# Patient Record
Sex: FEMALE | Race: WHITE | NOT HISPANIC OR LATINO | URBAN - METROPOLITAN AREA
[De-identification: names, ages, dates, MRNs, and addresses within clinical notes are randomized per-mention and may not be internally consistent; named-entity substitution may affect disease eponyms.]

---

## 2019-05-14 PROBLEM — Z98.84 BARIATRIC SURGERY STATUS: Status: ACTIVE | Noted: 2018-12-21

## 2019-05-14 PROBLEM — E04.1 NONTOXIC UNINODULAR GOITER: Status: ACTIVE | Noted: 2017-11-24

## 2020-02-04 PROBLEM — E78.00 HYPERCHOLESTEROLEMIA: Status: ACTIVE | Noted: 2020-02-04

## 2020-02-08 PROBLEM — L30.8 PSORIASIFORM ECZEMA: Status: ACTIVE | Noted: 2020-02-08

## 2020-02-08 PROBLEM — T81.42XA INFECTION OF DEEP INCISIONAL SURGICAL SITE AFTER PROCEDURE, INITIAL ENCOUNTER: Status: ACTIVE | Noted: 2020-02-08

## 2020-06-01 ENCOUNTER — E-ADVICE (OUTPATIENT)
Dept: SURGERY | Age: 50
End: 2020-06-01

## 2020-06-09 ENCOUNTER — OFFICE VISIT (OUTPATIENT)
Dept: SURGERY | Age: 50
End: 2020-06-09

## 2020-06-09 VITALS
HEIGHT: 63 IN | TEMPERATURE: 98.3 F | BODY MASS INDEX: 28.7 KG/M2 | DIASTOLIC BLOOD PRESSURE: 88 MMHG | SYSTOLIC BLOOD PRESSURE: 148 MMHG | WEIGHT: 162 LBS | HEART RATE: 72 BPM

## 2020-06-09 DIAGNOSIS — E88.1 LIPODYSTROPHY: Primary | ICD-10-CM

## 2020-06-09 RX ORDER — LEVOTHYROXINE SODIUM 0.2 MG/1
TABLET ORAL
COMMUNITY
Start: 2016-09-04

## 2020-06-09 RX ORDER — NYSTATIN 100000 [USP'U]/G
POWDER TOPICAL
COMMUNITY
Start: 2019-06-04

## 2020-06-09 RX ORDER — NYSTATIN 100000 U/G
CREAM TOPICAL
COMMUNITY
Start: 2020-04-13

## 2020-06-09 RX ORDER — FOLIC ACID 1 MG/1
TABLET ORAL
COMMUNITY
Start: 2019-04-12

## 2020-06-09 RX ORDER — CEFADROXIL 500 MG/1
CAPSULE ORAL
COMMUNITY
Start: 2020-03-16

## 2020-06-09 RX ORDER — FENOFIBRATE 150 MG/1
CAPSULE ORAL
COMMUNITY
Start: 2016-09-06

## 2020-06-09 RX ORDER — METHOTREXATE 25 MG/ML
INJECTION INTRA-ARTERIAL; INTRAMUSCULAR; INTRATHECAL; INTRAVENOUS
COMMUNITY
Start: 2019-05-11

## 2020-06-09 RX ORDER — USTEKINUMAB 45 MG/.5ML
INJECTION, SOLUTION SUBCUTANEOUS
COMMUNITY
Start: 2020-06-05

## 2020-06-09 RX ORDER — LEVOTHYROXINE SODIUM 0.1 MG/1
TABLET ORAL
COMMUNITY
Start: 2020-04-14

## 2020-06-09 RX ORDER — TRAMADOL HYDROCHLORIDE 50 MG/1
2 TABLET ORAL
COMMUNITY
Start: 2019-02-07

## 2020-06-09 RX ORDER — CLINDAMYCIN HYDROCHLORIDE 300 MG/1
CAPSULE ORAL
COMMUNITY
Start: 2020-04-27

## 2020-06-09 RX ORDER — LEVOTHYROXINE SODIUM 100 MCG
TABLET ORAL
COMMUNITY
Start: 2020-04-15

## 2020-06-09 RX ORDER — LEVOTHYROXINE SODIUM 0.12 MG/1
TABLET ORAL
COMMUNITY
Start: 2020-02-14

## 2020-06-09 RX ORDER — LANOLIN ALCOHOL/MO/W.PET/CERES
1000 CREAM (GRAM) TOPICAL
COMMUNITY
Start: 2017-11-03

## 2020-06-09 RX ORDER — FOLIC ACID 1 MG/1
TABLET ORAL
COMMUNITY
Start: 2020-06-08

## 2020-07-07 ASSESSMENT — ENCOUNTER SYMPTOMS
GASTROINTESTINAL NEGATIVE: 1
ALLERGIC/IMMUNOLOGIC NEGATIVE: 1
NEUROLOGICAL NEGATIVE: 1
CONSTITUTIONAL NEGATIVE: 1
RESPIRATORY NEGATIVE: 1
ENDOCRINE NEGATIVE: 1
EYES NEGATIVE: 1
PSYCHIATRIC NEGATIVE: 1
HEMATOLOGIC/LYMPHATIC NEGATIVE: 1

## 2020-09-21 ENCOUNTER — LAB ENCOUNTER (OUTPATIENT)
Dept: LAB | Facility: HOSPITAL | Age: 50
End: 2020-09-21
Attending: PLASTIC SURGERY
Payer: COMMERCIAL

## 2020-09-21 DIAGNOSIS — R79.89 LOW THYROID STIMULATING HORMONE (TSH) LEVEL: ICD-10-CM

## 2020-09-21 DIAGNOSIS — L98.7 EXCESS SKIN OF THIGH: ICD-10-CM

## 2020-09-22 LAB — SARS-COV-2 RNA RESP QL NAA+PROBE: NOT DETECTED

## 2020-09-23 ENCOUNTER — ANESTHESIA EVENT (OUTPATIENT)
Dept: SURGERY | Facility: HOSPITAL | Age: 50
End: 2020-09-23
Payer: COMMERCIAL

## 2020-09-24 ENCOUNTER — ANESTHESIA (OUTPATIENT)
Dept: SURGERY | Facility: HOSPITAL | Age: 50
End: 2020-09-24
Payer: COMMERCIAL

## 2020-09-24 ENCOUNTER — HOSPITAL ENCOUNTER (OUTPATIENT)
Facility: HOSPITAL | Age: 50
Discharge: HOME | End: 2020-09-25
Attending: PLASTIC SURGERY | Admitting: PLASTIC SURGERY
Payer: COMMERCIAL

## 2020-09-24 DIAGNOSIS — L98.7 EXCESS SKIN OF THIGH: Primary | ICD-10-CM

## 2020-09-24 PROCEDURE — 88304 TISSUE EXAM BY PATHOLOGIST: CPT | Performed by: PLASTIC SURGERY

## 2020-09-24 PROCEDURE — S0077 INJECTION, CLINDAMYCIN PHOSP: HCPCS | Performed by: PLASTIC SURGERY

## 2020-09-24 PROCEDURE — 0JBM0ZZ EXCISION OF LEFT UPPER LEG SUBCUTANEOUS TISSUE AND FASCIA, OPEN APPROACH: ICD-10-PCS | Performed by: PLASTIC SURGERY

## 2020-09-24 PROCEDURE — 81025 URINE PREGNANCY TEST: CPT | Performed by: PLASTIC SURGERY

## 2020-09-24 PROCEDURE — 0HBHXZZ EXCISION OF RIGHT UPPER LEG SKIN, EXTERNAL APPROACH: ICD-10-PCS | Performed by: PLASTIC SURGERY

## 2020-09-24 PROCEDURE — 0JBL0ZZ EXCISION OF RIGHT UPPER LEG SUBCUTANEOUS TISSUE AND FASCIA, OPEN APPROACH: ICD-10-PCS | Performed by: PLASTIC SURGERY

## 2020-09-24 PROCEDURE — 88305 TISSUE EXAM BY PATHOLOGIST: CPT | Performed by: PLASTIC SURGERY

## 2020-09-24 RX ORDER — CLINDAMYCIN PHOSPHATE 600 MG/50ML
600 INJECTION INTRAVENOUS EVERY 8 HOURS
Status: DISCONTINUED | OUTPATIENT
Start: 2020-09-24 | End: 2020-09-25

## 2020-09-24 RX ORDER — HYDROMORPHONE HYDROCHLORIDE 1 MG/ML
0.4 INJECTION, SOLUTION INTRAMUSCULAR; INTRAVENOUS; SUBCUTANEOUS EVERY 5 MIN PRN
Status: DISCONTINUED | OUTPATIENT
Start: 2020-09-24 | End: 2020-09-24 | Stop reason: HOSPADM

## 2020-09-24 RX ORDER — HYDROMORPHONE HYDROCHLORIDE 1 MG/ML
INJECTION, SOLUTION INTRAMUSCULAR; INTRAVENOUS; SUBCUTANEOUS
Status: COMPLETED
Start: 2020-09-24 | End: 2020-09-24

## 2020-09-24 RX ORDER — KETAMINE HYDROCHLORIDE 50 MG/ML
INJECTION, SOLUTION, CONCENTRATE INTRAMUSCULAR; INTRAVENOUS AS NEEDED
Status: DISCONTINUED | OUTPATIENT
Start: 2020-09-24 | End: 2020-09-24 | Stop reason: SURG

## 2020-09-24 RX ORDER — ENOXAPARIN SODIUM 100 MG/ML
40 INJECTION SUBCUTANEOUS DAILY
Status: COMPLETED | OUTPATIENT
Start: 2020-09-24 | End: 2020-09-24

## 2020-09-24 RX ORDER — DEXAMETHASONE SODIUM PHOSPHATE 4 MG/ML
VIAL (ML) INJECTION AS NEEDED
Status: DISCONTINUED | OUTPATIENT
Start: 2020-09-24 | End: 2020-09-24 | Stop reason: SURG

## 2020-09-24 RX ORDER — ONDANSETRON 2 MG/ML
4 INJECTION INTRAMUSCULAR; INTRAVENOUS AS NEEDED
Status: DISCONTINUED | OUTPATIENT
Start: 2020-09-24 | End: 2020-09-24 | Stop reason: HOSPADM

## 2020-09-24 RX ORDER — LIDOCAINE HYDROCHLORIDE AND EPINEPHRINE 10; 10 MG/ML; UG/ML
INJECTION, SOLUTION INFILTRATION; PERINEURAL AS NEEDED
Status: DISCONTINUED | OUTPATIENT
Start: 2020-09-24 | End: 2020-09-24 | Stop reason: HOSPADM

## 2020-09-24 RX ORDER — HYDROCODONE BITARTRATE AND ACETAMINOPHEN 5; 325 MG/1; MG/1
2 TABLET ORAL EVERY 4 HOURS PRN
Status: DISCONTINUED | OUTPATIENT
Start: 2020-09-24 | End: 2020-09-24 | Stop reason: SDUPTHER

## 2020-09-24 RX ORDER — ONDANSETRON 2 MG/ML
INJECTION INTRAMUSCULAR; INTRAVENOUS AS NEEDED
Status: DISCONTINUED | OUTPATIENT
Start: 2020-09-24 | End: 2020-09-24 | Stop reason: SURG

## 2020-09-24 RX ORDER — NALOXONE HYDROCHLORIDE 0.4 MG/ML
80 INJECTION, SOLUTION INTRAMUSCULAR; INTRAVENOUS; SUBCUTANEOUS AS NEEDED
Status: DISCONTINUED | OUTPATIENT
Start: 2020-09-24 | End: 2020-09-24 | Stop reason: HOSPADM

## 2020-09-24 RX ORDER — HYDROCODONE BITARTRATE AND ACETAMINOPHEN 5; 325 MG/1; MG/1
1 TABLET ORAL EVERY 4 HOURS PRN
Status: DISCONTINUED | OUTPATIENT
Start: 2020-09-24 | End: 2020-09-24 | Stop reason: SDUPTHER

## 2020-09-24 RX ORDER — SODIUM CHLORIDE, SODIUM LACTATE, POTASSIUM CHLORIDE, CALCIUM CHLORIDE 600; 310; 30; 20 MG/100ML; MG/100ML; MG/100ML; MG/100ML
INJECTION, SOLUTION INTRAVENOUS CONTINUOUS PRN
Status: DISCONTINUED | OUTPATIENT
Start: 2020-09-24 | End: 2020-09-24 | Stop reason: SURG

## 2020-09-24 RX ORDER — SODIUM CHLORIDE, SODIUM LACTATE, POTASSIUM CHLORIDE, CALCIUM CHLORIDE 600; 310; 30; 20 MG/100ML; MG/100ML; MG/100ML; MG/100ML
INJECTION, SOLUTION INTRAVENOUS CONTINUOUS
Status: DISCONTINUED | OUTPATIENT
Start: 2020-09-24 | End: 2020-09-24 | Stop reason: HOSPADM

## 2020-09-24 RX ORDER — ACETAMINOPHEN 500 MG
1000 TABLET ORAL ONCE AS NEEDED
Status: DISCONTINUED | OUTPATIENT
Start: 2020-09-24 | End: 2020-09-24 | Stop reason: HOSPADM

## 2020-09-24 RX ORDER — ZOLPIDEM TARTRATE 5 MG/1
5 TABLET ORAL NIGHTLY PRN
Status: DISCONTINUED | OUTPATIENT
Start: 2020-09-24 | End: 2020-09-25

## 2020-09-24 RX ORDER — ACETAMINOPHEN 500 MG
1000 TABLET ORAL ONCE
Status: DISCONTINUED | OUTPATIENT
Start: 2020-09-24 | End: 2020-09-24 | Stop reason: HOSPADM

## 2020-09-24 RX ORDER — ONDANSETRON 2 MG/ML
4 INJECTION INTRAMUSCULAR; INTRAVENOUS EVERY 6 HOURS PRN
Status: DISPENSED | OUTPATIENT
Start: 2020-09-24 | End: 2020-09-25

## 2020-09-24 RX ORDER — NEOSTIGMINE METHYLSULFATE 1 MG/ML
INJECTION INTRAVENOUS AS NEEDED
Status: DISCONTINUED | OUTPATIENT
Start: 2020-09-24 | End: 2020-09-24 | Stop reason: SURG

## 2020-09-24 RX ORDER — SODIUM CHLORIDE, SODIUM LACTATE, POTASSIUM CHLORIDE, CALCIUM CHLORIDE 600; 310; 30; 20 MG/100ML; MG/100ML; MG/100ML; MG/100ML
INJECTION, SOLUTION INTRAVENOUS CONTINUOUS
Status: DISCONTINUED | OUTPATIENT
Start: 2020-09-24 | End: 2020-09-24

## 2020-09-24 RX ORDER — ROCURONIUM BROMIDE 10 MG/ML
INJECTION, SOLUTION INTRAVENOUS AS NEEDED
Status: DISCONTINUED | OUTPATIENT
Start: 2020-09-24 | End: 2020-09-24 | Stop reason: SURG

## 2020-09-24 RX ORDER — ENOXAPARIN SODIUM 100 MG/ML
40 INJECTION SUBCUTANEOUS DAILY
Status: DISCONTINUED | OUTPATIENT
Start: 2020-09-25 | End: 2020-09-25

## 2020-09-24 RX ORDER — MIDAZOLAM HYDROCHLORIDE 1 MG/ML
INJECTION INTRAMUSCULAR; INTRAVENOUS AS NEEDED
Status: DISCONTINUED | OUTPATIENT
Start: 2020-09-24 | End: 2020-09-24 | Stop reason: SURG

## 2020-09-24 RX ORDER — BUPIVACAINE HYDROCHLORIDE AND EPINEPHRINE 5; 5 MG/ML; UG/ML
INJECTION, SOLUTION EPIDURAL; INTRACAUDAL; PERINEURAL AS NEEDED
Status: DISCONTINUED | OUTPATIENT
Start: 2020-09-24 | End: 2020-09-24 | Stop reason: HOSPADM

## 2020-09-24 RX ORDER — HYDROCODONE BITARTRATE AND ACETAMINOPHEN 5; 325 MG/1; MG/1
2 TABLET ORAL AS NEEDED
Status: DISCONTINUED | OUTPATIENT
Start: 2020-09-24 | End: 2020-09-24 | Stop reason: HOSPADM

## 2020-09-24 RX ORDER — HYDRALAZINE HYDROCHLORIDE 20 MG/ML
INJECTION INTRAMUSCULAR; INTRAVENOUS AS NEEDED
Status: DISCONTINUED | OUTPATIENT
Start: 2020-09-24 | End: 2020-09-24 | Stop reason: SURG

## 2020-09-24 RX ORDER — LIDOCAINE HYDROCHLORIDE 10 MG/ML
INJECTION, SOLUTION EPIDURAL; INFILTRATION; INTRACAUDAL; PERINEURAL AS NEEDED
Status: DISCONTINUED | OUTPATIENT
Start: 2020-09-24 | End: 2020-09-24 | Stop reason: SURG

## 2020-09-24 RX ORDER — GLYCOPYRROLATE 0.2 MG/ML
INJECTION, SOLUTION INTRAMUSCULAR; INTRAVENOUS AS NEEDED
Status: DISCONTINUED | OUTPATIENT
Start: 2020-09-24 | End: 2020-09-24 | Stop reason: SURG

## 2020-09-24 RX ORDER — HYDROCODONE BITARTRATE AND ACETAMINOPHEN 5; 325 MG/1; MG/1
1 TABLET ORAL EVERY 4 HOURS PRN
Status: DISCONTINUED | OUTPATIENT
Start: 2020-09-24 | End: 2020-09-25

## 2020-09-24 RX ORDER — DEXTROSE, SODIUM CHLORIDE, SODIUM LACTATE, POTASSIUM CHLORIDE, AND CALCIUM CHLORIDE 5; .6; .31; .03; .02 G/100ML; G/100ML; G/100ML; G/100ML; G/100ML
INJECTION, SOLUTION INTRAVENOUS CONTINUOUS
Status: DISCONTINUED | OUTPATIENT
Start: 2020-09-24 | End: 2020-09-25

## 2020-09-24 RX ORDER — METOCLOPRAMIDE HYDROCHLORIDE 5 MG/ML
10 INJECTION INTRAMUSCULAR; INTRAVENOUS AS NEEDED
Status: DISCONTINUED | OUTPATIENT
Start: 2020-09-24 | End: 2020-09-24 | Stop reason: HOSPADM

## 2020-09-24 RX ORDER — CLINDAMYCIN PHOSPHATE 900 MG/50ML
900 INJECTION INTRAVENOUS ONCE
Status: COMPLETED | OUTPATIENT
Start: 2020-09-24 | End: 2020-09-24

## 2020-09-24 RX ORDER — HYDROCODONE BITARTRATE AND ACETAMINOPHEN 5; 325 MG/1; MG/1
1 TABLET ORAL AS NEEDED
Status: DISCONTINUED | OUTPATIENT
Start: 2020-09-24 | End: 2020-09-24 | Stop reason: HOSPADM

## 2020-09-24 RX ADMIN — SODIUM CHLORIDE, SODIUM LACTATE, POTASSIUM CHLORIDE, CALCIUM CHLORIDE: 600; 310; 30; 20 INJECTION, SOLUTION INTRAVENOUS at 11:23:00

## 2020-09-24 RX ADMIN — MIDAZOLAM HYDROCHLORIDE 2 MG: 1 INJECTION INTRAMUSCULAR; INTRAVENOUS at 07:59:00

## 2020-09-24 RX ADMIN — GLYCOPYRROLATE 0.4 MG: 0.2 INJECTION, SOLUTION INTRAMUSCULAR; INTRAVENOUS at 11:07:00

## 2020-09-24 RX ADMIN — ENOXAPARIN SODIUM 40 MG: 100 INJECTION SUBCUTANEOUS at 08:10:00

## 2020-09-24 RX ADMIN — LIDOCAINE HYDROCHLORIDE 50 MG: 10 INJECTION, SOLUTION EPIDURAL; INFILTRATION; INTRACAUDAL; PERINEURAL at 07:59:00

## 2020-09-24 RX ADMIN — DEXAMETHASONE SODIUM PHOSPHATE 4 MG: 4 MG/ML VIAL (ML) INJECTION at 08:07:00

## 2020-09-24 RX ADMIN — HYDRALAZINE HYDROCHLORIDE 5 MG: 20 INJECTION INTRAMUSCULAR; INTRAVENOUS at 10:36:00

## 2020-09-24 RX ADMIN — ROCURONIUM BROMIDE 50 MG: 10 INJECTION, SOLUTION INTRAVENOUS at 07:59:00

## 2020-09-24 RX ADMIN — GLYCOPYRROLATE 0.2 MG: 0.2 INJECTION, SOLUTION INTRAMUSCULAR; INTRAVENOUS at 07:59:00

## 2020-09-24 RX ADMIN — CLINDAMYCIN PHOSPHATE 900 MG: 900 INJECTION INTRAVENOUS at 07:59:00

## 2020-09-24 RX ADMIN — NEOSTIGMINE METHYLSULFATE 3 MG: 1 INJECTION INTRAVENOUS at 11:07:00

## 2020-09-24 RX ADMIN — SODIUM CHLORIDE, SODIUM LACTATE, POTASSIUM CHLORIDE, CALCIUM CHLORIDE: 600; 310; 30; 20 INJECTION, SOLUTION INTRAVENOUS at 10:15:00

## 2020-09-24 RX ADMIN — ONDANSETRON 4 MG: 2 INJECTION INTRAMUSCULAR; INTRAVENOUS at 11:02:00

## 2020-09-24 RX ADMIN — KETAMINE HYDROCHLORIDE 30 MG: 50 INJECTION, SOLUTION, CONCENTRATE INTRAMUSCULAR; INTRAVENOUS at 08:07:00

## 2020-09-24 RX ADMIN — SODIUM CHLORIDE, SODIUM LACTATE, POTASSIUM CHLORIDE, CALCIUM CHLORIDE: 600; 310; 30; 20 INJECTION, SOLUTION INTRAVENOUS at 07:56:00

## 2020-09-24 NOTE — ANESTHESIA PROCEDURE NOTES
Airway  Date/Time: 9/24/2020 8:04 AM  Urgency: elective    Airway not difficult    General Information and Staff    Patient location during procedure: OR  Anesthesiologist: Shelby Reeves MD  Resident/CRNA: Carolin Rivers CRNA  Performed: CRNA     Amy

## 2020-09-24 NOTE — ANESTHESIA POSTPROCEDURE EVALUATION
[facility] Patient Status:  Outpatient in a Bed   Age/Gender 53year old female MRN [id number]   [facility] SURGERY Attending Gee Henry MD   Hosp Day # 0 PCP Thai Parker MD       Anesthesia Post-op Note

## 2020-09-24 NOTE — PROGRESS NOTES
NURSING ADMISSION NOTE      Patient admitted via Cart  Oriented to room. Safety precautions initiated. Bed in low position. Call light in reach. Received patient from  PACU . Alert and orient x 4 , sleepy easily awake . Vitals  Stable . CARLEY .  Naomi Silva

## 2020-09-24 NOTE — ANESTHESIA PREPROCEDURE EVALUATION
PRE-OP EVALUATION    Patient Name: Gerald Mejia    Pre-op Diagnosis: CODES GIVEN    Procedure(s):  BILATERAL MEDIAL THIGH LIFT    Surgeon(s) and Role:     * Ag Rivas MD - Primary    Pre-op vitals reviewed. Body mass index is 29.76 kg/m². summary reviewed. Anesthetic Complications  (-) history of anesthetic complications         GI/Hepatic/Renal  Comment: S/p bariatric surgery with weight loss                               Cardiovascular      ECG reviewed.                (+) hyperlipidemi trauma, eye injury,pulmonary complication and other complications as discussed in anesthesia consent form. Possivble prone positioning and precautions related to positioning explained. Patient agrees to proceed. Anesthesia consent signed.   Plan/risks discus

## 2020-09-24 NOTE — BRIEF OP NOTE
Pre-Operative Diagnosis: laxity of bilateral inner thigh skin with dermatitis      Post-Operative Diagnosis: laxity of bilateral inner thigh skin with dermatitis       Procedure Performed:   Procedure(s):  BILATERAL MEDIAL THIGH LIFT and excison right uppe

## 2020-09-24 NOTE — H&P
PLASTIC SURGERY HISTORY AND PHYSICAL:UPDATE     NAME: Rosemarie Gerber  DATE: 9/24/2020  CC: Abdominal wall laxity  HPI: Deannie Libman is a 53year old woman who presents with laxity of her inner thigh skin with dermatitis and other symptoms.   She is a candidate

## 2020-09-25 VITALS
HEART RATE: 60 BPM | RESPIRATION RATE: 18 BRPM | WEIGHT: 166.69 LBS | SYSTOLIC BLOOD PRESSURE: 109 MMHG | HEIGHT: 63 IN | BODY MASS INDEX: 29.54 KG/M2 | OXYGEN SATURATION: 100 % | DIASTOLIC BLOOD PRESSURE: 53 MMHG | TEMPERATURE: 99 F

## 2020-09-25 PROCEDURE — S0077 INJECTION, CLINDAMYCIN PHOSP: HCPCS | Performed by: PLASTIC SURGERY

## 2020-09-25 NOTE — PLAN OF CARE
Problem: Patient/Family Goals  Goal: Patient/Family Long Term Goal  Description: Patient's Long Term Goal: go home     Interventions:  - regular   - See additional Care Plan goals for specific interventions  Outcome: Adequate for Discharge  Goal: Patient

## 2020-09-25 NOTE — PLAN OF CARE
Pt is A&O, VSS, with mild c/o headache and thigh discomfort, medication given. Pt is on RA with bilateral clear diminished lung sounds,  in place. Abdomen is soft and nontender with active bowel sounds. Pt denies passing gas. Pt is voiding freely.  Mesh

## 2020-09-25 NOTE — PROGRESS NOTES
NURSING DISCHARGE NOTE    Discharged Home via Wheelchair. Accompanied by Family member and Support staff  Belongings Taken by patient/family discussed d/c instructions with patient and family . Answered all questions . Verbalized understanding .

## 2020-10-05 NOTE — OPERATIVE REPORT
OPERATIVE NOTE: Bilateral Medial Thigh Lift with Excision of Right Thigh Lesion     NAME:      Rosemarie Gerber  DATE:      September 24, 2020   LOCATION:    [facility]  CSN:     [id number]  MRN:     [id number]  PREOPERATIVE DIAGNOSIS:  Bilateral media mattress sutures of 2-0 vicryl were placed in Acosta's and Colle's fascia. The perineal thigh wound was closed with interrupted vertical mattress sutures of 4-0 vicryl.   The anterior part of the skin closure was completed with a running deep dermal and sub

## 2021-11-22 PROBLEM — D50.0 IRON DEFICIENCY ANEMIA DUE TO CHRONIC BLOOD LOSS: Status: ACTIVE | Noted: 2021-11-22

## 2022-11-29 ENCOUNTER — LAB ENCOUNTER (OUTPATIENT)
Dept: LAB | Facility: HOSPITAL | Age: 52
End: 2022-11-29
Attending: PLASTIC SURGERY
Payer: COMMERCIAL

## 2022-11-29 DIAGNOSIS — Z01.818 PRE-OP TESTING: ICD-10-CM

## 2022-11-30 LAB — SARS-COV-2 RNA RESP QL NAA+PROBE: NOT DETECTED

## 2022-12-01 ENCOUNTER — ANESTHESIA EVENT (OUTPATIENT)
Dept: SURGERY | Facility: HOSPITAL | Age: 52
End: 2022-12-01

## 2022-12-02 ENCOUNTER — ANESTHESIA (OUTPATIENT)
Dept: SURGERY | Facility: HOSPITAL | Age: 52
End: 2022-12-02

## 2022-12-02 ENCOUNTER — HOSPITAL ENCOUNTER (OUTPATIENT)
Facility: HOSPITAL | Age: 52
Setting detail: HOSPITAL OUTPATIENT SURGERY
Discharge: HOME | End: 2022-12-02
Attending: PLASTIC SURGERY | Admitting: PLASTIC SURGERY

## 2022-12-02 VITALS
TEMPERATURE: 97 F | WEIGHT: 196.63 LBS | RESPIRATION RATE: 18 BRPM | OXYGEN SATURATION: 97 % | HEART RATE: 60 BPM | HEIGHT: 63 IN | DIASTOLIC BLOOD PRESSURE: 86 MMHG | BODY MASS INDEX: 34.84 KG/M2 | SYSTOLIC BLOOD PRESSURE: 146 MMHG

## 2022-12-02 DIAGNOSIS — Z01.818 PRE-OP TESTING: Primary | ICD-10-CM

## 2022-12-02 LAB — B-HCG UR QL: NEGATIVE

## 2022-12-02 PROCEDURE — 81025 URINE PREGNANCY TEST: CPT

## 2022-12-02 PROCEDURE — 0J0D0ZZ ALTERATION OF RIGHT UPPER ARM SUBCUTANEOUS TISSUE AND FASCIA, OPEN APPROACH: ICD-10-PCS | Performed by: PLASTIC SURGERY

## 2022-12-02 PROCEDURE — 0HQV0ZZ REPAIR BILATERAL BREAST, OPEN APPROACH: ICD-10-PCS | Performed by: PLASTIC SURGERY

## 2022-12-02 PROCEDURE — 0J0F0ZZ ALTERATION OF LEFT UPPER ARM SUBCUTANEOUS TISSUE AND FASCIA, OPEN APPROACH: ICD-10-PCS | Performed by: PLASTIC SURGERY

## 2022-12-02 RX ORDER — SODIUM CHLORIDE, SODIUM LACTATE, POTASSIUM CHLORIDE, CALCIUM CHLORIDE 600; 310; 30; 20 MG/100ML; MG/100ML; MG/100ML; MG/100ML
INJECTION, SOLUTION INTRAVENOUS CONTINUOUS
Status: DISCONTINUED | OUTPATIENT
Start: 2022-12-02 | End: 2022-12-02

## 2022-12-02 RX ORDER — ROCURONIUM BROMIDE 10 MG/ML
INJECTION, SOLUTION INTRAVENOUS AS NEEDED
Status: DISCONTINUED | OUTPATIENT
Start: 2022-12-02 | End: 2022-12-02 | Stop reason: SURG

## 2022-12-02 RX ORDER — MEPERIDINE HYDROCHLORIDE 25 MG/ML
12.5 INJECTION INTRAMUSCULAR; INTRAVENOUS; SUBCUTANEOUS AS NEEDED
Status: DISCONTINUED | OUTPATIENT
Start: 2022-12-02 | End: 2022-12-02

## 2022-12-02 RX ORDER — ACETAMINOPHEN 500 MG
1000 TABLET ORAL ONCE
Status: DISCONTINUED | OUTPATIENT
Start: 2022-12-02 | End: 2022-12-02 | Stop reason: HOSPADM

## 2022-12-02 RX ORDER — HEPARIN SODIUM 5000 [USP'U]/ML
INJECTION, SOLUTION INTRAVENOUS; SUBCUTANEOUS
Status: COMPLETED
Start: 2022-12-02 | End: 2022-12-02

## 2022-12-02 RX ORDER — HYDROCODONE BITARTRATE AND ACETAMINOPHEN 5; 325 MG/1; MG/1
1 TABLET ORAL ONCE AS NEEDED
Status: COMPLETED | OUTPATIENT
Start: 2022-12-02 | End: 2022-12-02

## 2022-12-02 RX ORDER — ONDANSETRON 2 MG/ML
INJECTION INTRAMUSCULAR; INTRAVENOUS AS NEEDED
Status: DISCONTINUED | OUTPATIENT
Start: 2022-12-02 | End: 2022-12-02 | Stop reason: SURG

## 2022-12-02 RX ORDER — HEPARIN SODIUM 5000 [USP'U]/ML
5000 INJECTION, SOLUTION INTRAVENOUS; SUBCUTANEOUS ONCE
Status: COMPLETED | OUTPATIENT
Start: 2022-12-02 | End: 2022-12-02

## 2022-12-02 RX ORDER — CLINDAMYCIN PHOSPHATE 900 MG/50ML
900 INJECTION INTRAVENOUS ONCE
Status: COMPLETED | OUTPATIENT
Start: 2022-12-02 | End: 2022-12-02

## 2022-12-02 RX ORDER — HYDROMORPHONE HYDROCHLORIDE 1 MG/ML
0.4 INJECTION, SOLUTION INTRAMUSCULAR; INTRAVENOUS; SUBCUTANEOUS EVERY 5 MIN PRN
Status: DISCONTINUED | OUTPATIENT
Start: 2022-12-02 | End: 2022-12-02

## 2022-12-02 RX ORDER — LIDOCAINE HYDROCHLORIDE 10 MG/ML
INJECTION, SOLUTION EPIDURAL; INFILTRATION; INTRACAUDAL; PERINEURAL AS NEEDED
Status: DISCONTINUED | OUTPATIENT
Start: 2022-12-02 | End: 2022-12-02 | Stop reason: SURG

## 2022-12-02 RX ORDER — HYDROCODONE BITARTRATE AND ACETAMINOPHEN 5; 325 MG/1; MG/1
2 TABLET ORAL ONCE AS NEEDED
Status: COMPLETED | OUTPATIENT
Start: 2022-12-02 | End: 2022-12-02

## 2022-12-02 RX ORDER — SCOLOPAMINE TRANSDERMAL SYSTEM 1 MG/1
1 PATCH, EXTENDED RELEASE TRANSDERMAL ONCE
Status: DISCONTINUED | OUTPATIENT
Start: 2022-12-02 | End: 2022-12-02

## 2022-12-02 RX ORDER — CLINDAMYCIN PHOSPHATE 900 MG/50ML
INJECTION INTRAVENOUS
Status: DISCONTINUED
Start: 2022-12-02 | End: 2022-12-02

## 2022-12-02 RX ORDER — NEOSTIGMINE METHYLSULFATE 1 MG/ML
INJECTION, SOLUTION INTRAVENOUS AS NEEDED
Status: DISCONTINUED | OUTPATIENT
Start: 2022-12-02 | End: 2022-12-02 | Stop reason: SURG

## 2022-12-02 RX ORDER — MIDAZOLAM HYDROCHLORIDE 1 MG/ML
1 INJECTION INTRAMUSCULAR; INTRAVENOUS EVERY 5 MIN PRN
Status: DISCONTINUED | OUTPATIENT
Start: 2022-12-02 | End: 2022-12-02

## 2022-12-02 RX ORDER — HYDROMORPHONE HYDROCHLORIDE 1 MG/ML
0.6 INJECTION, SOLUTION INTRAMUSCULAR; INTRAVENOUS; SUBCUTANEOUS EVERY 5 MIN PRN
Status: DISCONTINUED | OUTPATIENT
Start: 2022-12-02 | End: 2022-12-02

## 2022-12-02 RX ORDER — ACETAMINOPHEN 500 MG
1000 TABLET ORAL EVERY 6 HOURS PRN
COMMUNITY

## 2022-12-02 RX ORDER — PROCHLORPERAZINE EDISYLATE 5 MG/ML
5 INJECTION INTRAMUSCULAR; INTRAVENOUS EVERY 8 HOURS PRN
Status: DISCONTINUED | OUTPATIENT
Start: 2022-12-02 | End: 2022-12-02

## 2022-12-02 RX ORDER — HYDROMORPHONE HYDROCHLORIDE 1 MG/ML
INJECTION, SOLUTION INTRAMUSCULAR; INTRAVENOUS; SUBCUTANEOUS
Status: COMPLETED
Start: 2022-12-02 | End: 2022-12-02

## 2022-12-02 RX ORDER — DEXAMETHASONE SODIUM PHOSPHATE 4 MG/ML
VIAL (ML) INJECTION AS NEEDED
Status: DISCONTINUED | OUTPATIENT
Start: 2022-12-02 | End: 2022-12-02 | Stop reason: SURG

## 2022-12-02 RX ORDER — MIDAZOLAM HYDROCHLORIDE 1 MG/ML
INJECTION INTRAMUSCULAR; INTRAVENOUS AS NEEDED
Status: DISCONTINUED | OUTPATIENT
Start: 2022-12-02 | End: 2022-12-02 | Stop reason: SURG

## 2022-12-02 RX ORDER — LIDOCAINE HYDROCHLORIDE 10 MG/ML
INJECTION, SOLUTION INFILTRATION; PERINEURAL AS NEEDED
Status: DISCONTINUED | OUTPATIENT
Start: 2022-12-02 | End: 2022-12-02 | Stop reason: HOSPADM

## 2022-12-02 RX ORDER — SCOLOPAMINE TRANSDERMAL SYSTEM 1 MG/1
PATCH, EXTENDED RELEASE TRANSDERMAL
Status: DISCONTINUED
Start: 2022-12-02 | End: 2022-12-02

## 2022-12-02 RX ORDER — ACETAMINOPHEN 500 MG
1000 TABLET ORAL ONCE AS NEEDED
Status: COMPLETED | OUTPATIENT
Start: 2022-12-02 | End: 2022-12-02

## 2022-12-02 RX ORDER — BUPIVACAINE HYDROCHLORIDE 5 MG/ML
INJECTION, SOLUTION EPIDURAL; INTRACAUDAL AS NEEDED
Status: DISCONTINUED | OUTPATIENT
Start: 2022-12-02 | End: 2022-12-02 | Stop reason: HOSPADM

## 2022-12-02 RX ORDER — HYDROMORPHONE HYDROCHLORIDE 1 MG/ML
0.2 INJECTION, SOLUTION INTRAMUSCULAR; INTRAVENOUS; SUBCUTANEOUS EVERY 5 MIN PRN
Status: DISCONTINUED | OUTPATIENT
Start: 2022-12-02 | End: 2022-12-02

## 2022-12-02 RX ORDER — NALOXONE HYDROCHLORIDE 0.4 MG/ML
80 INJECTION, SOLUTION INTRAMUSCULAR; INTRAVENOUS; SUBCUTANEOUS AS NEEDED
Status: DISCONTINUED | OUTPATIENT
Start: 2022-12-02 | End: 2022-12-02

## 2022-12-02 RX ORDER — KETAMINE HYDROCHLORIDE 50 MG/ML
INJECTION, SOLUTION, CONCENTRATE INTRAMUSCULAR; INTRAVENOUS AS NEEDED
Status: DISCONTINUED | OUTPATIENT
Start: 2022-12-02 | End: 2022-12-02 | Stop reason: SURG

## 2022-12-02 RX ORDER — GLYCOPYRROLATE 0.2 MG/ML
INJECTION, SOLUTION INTRAMUSCULAR; INTRAVENOUS AS NEEDED
Status: DISCONTINUED | OUTPATIENT
Start: 2022-12-02 | End: 2022-12-02 | Stop reason: SURG

## 2022-12-02 RX ORDER — ONDANSETRON 2 MG/ML
4 INJECTION INTRAMUSCULAR; INTRAVENOUS EVERY 6 HOURS PRN
Status: DISCONTINUED | OUTPATIENT
Start: 2022-12-02 | End: 2022-12-02

## 2022-12-02 RX ORDER — LABETALOL HYDROCHLORIDE 5 MG/ML
5 INJECTION, SOLUTION INTRAVENOUS EVERY 5 MIN PRN
Status: DISCONTINUED | OUTPATIENT
Start: 2022-12-02 | End: 2022-12-02

## 2022-12-02 RX ORDER — TRANEXAMIC ACID 10 MG/ML
INJECTION, SOLUTION INTRAVENOUS AS NEEDED
Status: DISCONTINUED | OUTPATIENT
Start: 2022-12-02 | End: 2022-12-02 | Stop reason: SURG

## 2022-12-02 RX ADMIN — MIDAZOLAM HYDROCHLORIDE 2 MG: 1 INJECTION INTRAMUSCULAR; INTRAVENOUS at 07:57:00

## 2022-12-02 RX ADMIN — DEXAMETHASONE SODIUM PHOSPHATE 8 MG: 4 MG/ML VIAL (ML) INJECTION at 08:07:00

## 2022-12-02 RX ADMIN — ROCURONIUM BROMIDE 20 MG: 10 INJECTION, SOLUTION INTRAVENOUS at 09:00:00

## 2022-12-02 RX ADMIN — ROCURONIUM BROMIDE 10 MG: 10 INJECTION, SOLUTION INTRAVENOUS at 11:03:00

## 2022-12-02 RX ADMIN — GLYCOPYRROLATE 0.8 MG: 0.2 INJECTION, SOLUTION INTRAMUSCULAR; INTRAVENOUS at 12:37:00

## 2022-12-02 RX ADMIN — ROCURONIUM BROMIDE 20 MG: 10 INJECTION, SOLUTION INTRAVENOUS at 09:47:00

## 2022-12-02 RX ADMIN — TRANEXAMIC ACID 1000 MG: 10 INJECTION, SOLUTION INTRAVENOUS at 08:37:00

## 2022-12-02 RX ADMIN — SODIUM CHLORIDE, SODIUM LACTATE, POTASSIUM CHLORIDE, CALCIUM CHLORIDE: 600; 310; 30; 20 INJECTION, SOLUTION INTRAVENOUS at 11:22:00

## 2022-12-02 RX ADMIN — NEOSTIGMINE METHYLSULFATE 5 MG: 1 INJECTION, SOLUTION INTRAVENOUS at 12:37:00

## 2022-12-02 RX ADMIN — CLINDAMYCIN PHOSPHATE 900 MG: 900 INJECTION INTRAVENOUS at 08:06:00

## 2022-12-02 RX ADMIN — LIDOCAINE HYDROCHLORIDE 50 MG: 10 INJECTION, SOLUTION EPIDURAL; INFILTRATION; INTRACAUDAL; PERINEURAL at 08:01:00

## 2022-12-02 RX ADMIN — GLYCOPYRROLATE 0.2 MG: 0.2 INJECTION, SOLUTION INTRAMUSCULAR; INTRAVENOUS at 08:07:00

## 2022-12-02 RX ADMIN — SODIUM CHLORIDE, SODIUM LACTATE, POTASSIUM CHLORIDE, CALCIUM CHLORIDE: 600; 310; 30; 20 INJECTION, SOLUTION INTRAVENOUS at 12:58:00

## 2022-12-02 RX ADMIN — KETAMINE HYDROCHLORIDE 25 MG: 50 INJECTION, SOLUTION, CONCENTRATE INTRAMUSCULAR; INTRAVENOUS at 08:07:00

## 2022-12-02 RX ADMIN — ONDANSETRON 4 MG: 2 INJECTION INTRAMUSCULAR; INTRAVENOUS at 11:56:00

## 2022-12-02 RX ADMIN — ROCURONIUM BROMIDE 50 MG: 10 INJECTION, SOLUTION INTRAVENOUS at 08:01:00

## 2022-12-02 RX ADMIN — SODIUM CHLORIDE, SODIUM LACTATE, POTASSIUM CHLORIDE, CALCIUM CHLORIDE: 600; 310; 30; 20 INJECTION, SOLUTION INTRAVENOUS at 07:57:00

## 2022-12-02 NOTE — DISCHARGE INSTRUCTIONS
Home Care Instructions Following Your Breast Surgery     Cb Abarca-  We hope you were pleased with your care at [facility].  We wish you the best outcome and overall experience with your operation. These instructions will help to minimize pain, optimize healing, and improve the likelihood of a successful result. What To Expect  There will be some spotting of the incision lines for the next 1-2 weeks. Your breasts will be swollen and might feel congested (similar to breast feeding) for the next 1-2 weeks  Temporary areas of numbness are typical in the early weeks following a breast reduction. Normalization of sensation can typically take up to several months following the operation. Bandages (Dressing)  Keep dressings clean and dry  Do not remove your bra  Reinforce the dressing with insertion of additional pads (pantiliners, incontinence pads) as needed  Do not shower until after your first appointment with Dr. Nicolle Gleason might have placed a drain as discussed prior to your operation. Proper drain care is an important part of your home care and will help to prevent fluid collection, minimize the risk for infection, and increase the success of your breast reconstruction. Empty your drains at 8 am and 8 pm each day  Record each drain separately and use the drain sheet given to you by Dr. Paco Gleason to record the drainage. Follow the instructions on the drain sheet. Wash your hands before and after emptying your drains  Bring drain sheet with you to your first office visit    Bathing/Showers  You can resume showers after your drains are removed in the office  No baths, swimming, or hot tubs until you receive medical permission    Pain Medication: Norco, Vicodin, or Tylenol #3  Take one or two tablets every four hours as needed for pain. Do not exceed 8 (eight) tablets each day  Do not take narcotics, if you do not have pain.     Antibiotics:  Antibiotics will help minimize the risk of a wound infection  Fill the prescription as directed by Dr. Colton Rosario  Follow the instructions as written on the bottle's label  Call Dr. Colton Rosario, if you experience nausea, rash, or other symptoms which might be a possible side effect. Over-The-Counter Medication  Non-prescription anti-inflammatory medications can also help to ease the pain. You can take Aleve or ibuprofen   Take as directed on the bottle  Drink a full glass of water with the medication    Home Medication  Resume your home medications as instructed  Do not resume herbal medications for two weeks    Diet  Resume your normal diet    Activity  No strenuous activity or heavy lifting  You can go up and down the stairs as tolerated. Use common sense  You cannot return to work, if your work requires strenuous activity. You cannot return to physical exercise, sports, or gym workouts until you are allowed to participate in strenuous activity. Driving  Do not drive, if you are taking pain medication. Return to Work or [employer] can return to work when you are not taking pain medication, if your work does not involve strenuous activity. Contact Dr. Hilda Brian office, if you need a medical note. You can return to school in 4-5 days but not sports or gym class for 6 weeks. Follow-up Appointment with Dr. Colton Rosario scheduled your first postoperative visit at the time of your preoperative office visit. Call Dr. Hilda Brian office today for an appointment in two days, if you cannot remember the appointment details. The number is . Verify your appointment date, day, time, and location. At your 1st postoperative office visit:  Your drains will be examined, wounds will be evaluated, healing assessed, and any additional concerns and instructions will be discussed. Questions or Concerns  Call Dr. Colton Rosario if you experience severe pain not controlled by pain medication, swelling, numbness, tingling, bleeding, fever, or other concerns.   The number is: . Natali Davidson  Thank you for coming to [facility] for your operation. The nurses and the anesthesiologist try very hard to make sure you receive the best care possible. Your trust in them is greatly appreciated.     Thanks so much,  Dr. Traci Becerril have been given a prescription for Mendez Charter was Given to you at:3:53 pm  Next dose due:  7:53 pm  Take this medication as directed  This medication contains Tylenol (acetaminophen)  Do not take additional Tylenol while taking Loralie Bolus is a Narcotic and can be constipating or upset your stomach  Don't take Norco on an empty stomach  Drink plenty of water  Alcoholic beverages should be avoided while taking narcotics

## 2022-12-02 NOTE — ANESTHESIA POSTPROCEDURE EVALUATION
[address] [address] Patient Status:  Hospital Outpatient Surgery   Age/Gender 47year old female MRN [id number]   [facility] SURGERY Attending Natasha Richmond MD   Hosp Day # 0 PCP Luisa Patiño MD       Anesthesia Post-op Note    Bilateral Arm Brachioplasty, Bilateral Breast Mastopexy    Procedure Summary     Date: 12/02/22 Room / Location: [address] [place] [address] / [address] [place] [address] MAIN OR    Anesthesia Start: 8810 Anesthesia Stop: 5440    Procedures:       Bilateral Arm Brachioplasty, Bilateral Breast Mastopexy (Bilateral)      . (Bilateral) Diagnosis:       Redundant skin      (Redundant skin [L98.7])    Surgeons: Natasha Richmond MD Anesthesiologist: Kervin Oliver MD    Anesthesia Type: general ASA Status: 2          Anesthesia Type: general    Vitals Value Taken Time   /97 12/02/22 1257   Temp 97.2 12/02/22 1258   Pulse 73 12/02/22 1257   Resp 11 12/02/22 1257   SpO2 99 % 12/02/22 1257   Vitals shown include unvalidated device data. Patient Location: PACU    Anesthesia Type: general    Airway Patency: patent and extubated    Postop Pain Control: adequate    Mental Status: preanesthetic baseline    Nausea/Vomiting: none    Cardiopulmonary/Hydration status: stable euvolemic    Complications: no apparent anesthesia related complications    Postop vital signs: stable    Dental Exam: Unchanged from Preop    Patient to be discharged from PACU when criteria met.

## 2022-12-02 NOTE — ANESTHESIA PROCEDURE NOTES
Airway  Date/Time: 12/2/2022 8:03 AM  Urgency: elective    Airway not difficult    General Information and Staff    Patient location during procedure: OR  Anesthesiologist: Kervin Oliver MD  Resident/CRNA: Ayo Sena CRNA  Performed: CRNA     Indications and Patient Condition  Indications for airway management: anesthesia  Spontaneous Ventilation: absent  Sedation level: deep  Preoxygenated: yes  Patient position: sniffing  Mask difficulty assessment: 1 - vent by mask    Final Airway Details  Final airway type: endotracheal airway      Successful airway: ETT  Cuffed: yes   Successful intubation technique: direct laryngoscopy  Facilitating devices/methods: intubating stylet and cricoid pressure  Endotracheal tube insertion site: oral  Blade: Chiara  Blade size: #3  ETT size (mm): 7.0    Cormack-Lehane Classification: grade IIA - partial view of glottis  Placement verified by: chest auscultation and capnometry   Measured from: lips  ETT to lips (cm): 21  Number of attempts at approach: 1  Number of other approaches attempted: 0    Additional Comments  Dentition per pre op

## 2022-12-02 NOTE — BRIEF OP NOTE
Pre-Operative Diagnosis: Redundant skin [L98.7]     Post-Operative Diagnosis: Redundant skin [L98.7]      Procedure Performed:   Bilateral Arm Brachioplasty, Bilateral Breast Mastopexy    Surgeon(s) and Role:     * Tyrone Benavidez MD - Primary    Assistant(s):  Surgical Assistant.: Suleman Saha     Surgical Findings: Normal breast      Specimen: None     Estimated Blood Loss: Blood Output: 100 mL (12/2/2022 12:36 PM)      Dictation Number:  ? s    Haley Nice MD  12/2/2022  12:57 PM

## 2022-12-05 NOTE — OPERATIVE REPORT
OPERATIVE NOTE: Bilateral Mastopexy and Brachioplasty     NAME:      Candelaria Hutton  DATE:      December 2, 2022   LOCATION:    [facility]  CSN:     [id number]  MRN:     [id number]  PREOPERATIVE DIAGNOSIS:  Bilateral Breast Ptosis and Arm Laxity  POSTOPERATIVE DIAGNOSIS:  Same  SURGEON:    Dr. Brittany Mccann:    José Miguel Ambrose CSA  SURGICAL FINDINGS:   Skin laxity of breast and arms  SPECIMEN:    None    Consent: Bilateral Brachioplasty and Mastopexy  The nature of the surgery was discussed. I also discussed the indication for the procedure, its goals, expected benefits, consequences/side effects of the operation, potential risks and adverse outcomes with probability and severity, alternatives to be considered (benefits, risks, consequences), postoperative care, and outcome expected. Diego Zuñiga  verbalized understanding of the discussion, her questions were answered, and she agreed to proceed. In the privacy of her SDS room, she was placed in a sitting position with her arms abducted to 90 degrees. The following marks were made with a surgical skin scribe: the medial epicondyle, the bicipital groove, the planned superior axial incision of the upper arm placed 1 cm superior to the bicipital groove, the inferior border of the upper arm skin, the anterior axillary fold, and the midaxillary line of the lateral chest. The upper arm was marked in thirds. Her midline anterior chest, clavicular margins, inframammary folds, vertical breast meridians, and planned lines for a vertical mastopexy were inscribed while she was in a sitting position. Procedure:  She was brought to the operating room and placed on the OR table in a supine position. She was positioned after administration of general anesthesia. Her breasts were prepped and draped in the usual fashion.  A timeout was rendered by the circulating nurse and verified by Dr. Kaci Thao and the anesthesiologist.  The right breast nipple-areolar complex was incised along the previously inscribed marks. The incisions for a vertical mastopexy was incised. The skin circumferential to the NAC incision and within the boundaries of the outer incision was excised. Medial and lateral skin flaps were elevated in a plane deep to Acosta's fascia. The patient was placed in a sitting position and horizontal limbs to the medial and lateral flaps were determined and marked. Patient was returned to a supine position. The skin inferior to the horizontal limbs of the medial and lateral flaps was excised. The nipple-areolus was transposed into its recipient bed. The medial and lateral flaps were loosely stapled to each other. The horizontal limbs of the medial and lateral flaps were stapled to the inframammary folds. Patient was placed in the sitting position to assess the shape and position of the breast.  She returned to a supine position. The same procedure was performed on the left breast. Hemostasis was obtained. The wounds were irrigated. The inframmary fold was closed with running deep dermal sutures and another layer of subcuticular running suture, both of 4-0 Monocryl. The NAC and the vertical limbs were closed with a subcuticular suture of 4-0 Monocryl. Needle and sponge counts were correct. The breast were covered with a towel. Attention was then directed to the arms. The arms were abducted onto arm tables, prepped from wrist to lateral flank and breasts including the shoulders, and stockingnettes were placed over the hands and forearms. The drapes were placed in position. An incision was made along the superior abundio of the upper arm from the axilla to the area of the medial condyle. In the upper two thirds of the arm, an inferior flap was elevated deep to Acosta's fascia to the inferior upper arm marking.  In the lower third of the upper arm, the flap elevation was transitioned to superficial to Acosta's fascia to avoid injury to the medial antebrachial cutaneous nerve. Towel clips were placed at 6 cm intervals on the edge of the inferior flap, and the flap was transposed superiorly to determine the excess skin to be excised at each interval. These points were marked with methylene blue. A line was then made to connect the points and thereby marking the amount of skin to be excised. The excess skin was excised from the superior aspect of the inferior flap. Attention was directed toward the axilla and flank. A contiguous parabola of skin was excised from the superior mid-axillary flank. Dissection in the axillary area was superficial to avoid disruption of the lymphatics. Hemostasis was obtained and the wound was irrigated with copious amount of fluid. Acosta's fascia was approximated using interrupted 3-0 vicryl. A 7 mm JOAN drain was placed deep to Acosta's fascia and secured to inferior flank closure with a 4-0 nylon. The skin was closed with a deep dermal running 4-0 Monocryl and a 4-0 Monocryl subcuticular suture. The skin was 1/2 inch SteriStrips cut into halves and placed crosswise across the incisions. The wounds were dressed with Kerlix rolls, ABD pads, and a 4\" ace wrap. The breast wounds were then steri-stripped, dressed with two Kerlix rolls, ABD pads, and a surgical bra  At the completion of the operation, needle and sponge counts were rendered correct. There was no active bleeding. All tissue appeared viable. Devorah Noland was extubated in the OR and transferred to PACU in stable condition. She was discharged to home in stable condition with written home care instructions. She was also given her prescriptions, home care instructions, and postoperative appointment logistics prior to surgery. Her instructions, postoperative care, drain care, and prescriptions were reviewed in the office prior to surgery. She has Dr. Tennille Del Angel office and cell numbers to contact Dr. Emely Falk with questions and concerns.     The surgical assistant (CSA) was present to help set up the operating room, position the patient, and was scrubbed through the entirety of the procedure. She helped to apply surgical dressings, transfer the patient to the gurney, and accompanied the transport of the patient to the PACU. The surgical assistant participated in all aspects of the procedure by critical retraction and exposure of the soft tissue for flap elevation. Her assistance was essential for the protection of vital anatomical structures and at all times medically necessary for the surgeon to successfully complete the operation. A credentialed and well-trained surgical assistant's ability exceeds the ability of a surgical technician (CST). Plastic surgical residents are not available at this institution, and the surgical assistant was required for the safety of patient care.

## 2022-12-05 NOTE — OPERATIVE REPORT
OPERATIVE NOTE: Bilateral Mastopexy and Brachioplasty     NAME:      Macrina Cerna  DATE:      December 2, 2022   LOCATION:    [facility]  CSN:     [id number]  MRN:     [id number]  PREOPERATIVE DIAGNOSIS:  Bilateral Breast Ptosis and Arm Laxity  POSTOPERATIVE DIAGNOSIS:  Same  SURGEON:    Dr. Mei Carmen:    Tiffanie Brown CSA  SURGICAL FINDINGS:   Skin laxity of breast and arms  SPECIMEN:    None    Consent: Bilateral Brachioplasty and Mastopexy  The nature of the surgery was discussed. I also discussed the indication for the procedure, its goals, expected benefits, consequences/side effects of the operation, potential risks and adverse outcomes with probability and severity, alternatives to be considered (benefits, risks, consequences), postoperative care, and outcome expected. Edgar  verbalized understanding of the discussion, her questions were answered, and she agreed to proceed. In the privacy of her SDS room, she was placed in a sitting position with her arms abducted to 90 degrees. The following marks were made with a surgical skin scribe: the medial epicondyle, the bicipital groove, the planned superior axial incision of the upper arm placed 1 cm superior to the bicipital groove, the inferior border of the upper arm skin, the anterior axillary fold, and the midaxillary line of the lateral chest. The upper arm was marked in thirds. Her midline anterior chest, clavicular margins, inframammary folds, vertical breast meridians, and planned lines for a vertical mastopexy were inscribed while she was in a sitting position. Procedure:  She was brought to the operating room and placed on the OR table in a supine position. She was positioned after administration of general anesthesia. Her breasts were prepped and draped in the usual fashion.  A timeout was rendered by the circulating nurse and verified by Dr. Karine Wang and the anesthesiologist.  The right breast nipple-areolar complex was incised along the previously inscribed marks. The incisions for a vertical mastopexy was incised. The skin circumferential to the NAC incision and within the boundaries of the outer incision was excised. Medial and lateral skin flaps were elevated in a plane deep to Acosta's fascia. The patient was placed in a sitting position and horizontal limbs to the medial and lateral flaps were determined and marked. Patient was returned to a supine position. The skin inferior to the horizontal limbs of the medial and lateral flaps was excised. The nipple-areolus was transposed into its recipient bed. The medial and lateral flaps were loosely stapled to each other. The horizontal limbs of the medial and lateral flaps were stapled to the inframammary folds. Patient was placed in the sitting position to assess the shape and position of the breast.  She returned to a supine position. The same procedure was performed on the left breast. Hemostasis was obtained. The wounds were irrigated. The inframmary fold was closed with running deep dermal sutures and another layer of subcuticular running suture, both of 4-0 Monocryl. The NAC and the vertical limbs were closed with a subcuticular suture of 4-0 Monocryl. Needle and sponge counts were correct. The breast were covered with a towel. Attention was then directed to the arms. The arms were abducted onto arm tables, prepped from wrist to lateral flank and breasts including the shoulders, and stockingnettes were placed over the hands and forearms. The drapes were placed in position. An incision was made along the superior abundio of the upper arm from the axilla to the area of the medial condyle. In the upper two thirds of the arm, an inferior flap was elevated deep to Acosta's fascia to the inferior upper arm marking.  In the lower third of the upper arm, the flap elevation was transitioned to superficial to Acosta's fascia to avoid injury to the medial antebrachial cutaneous nerve. Towel clips were placed at 6 cm intervals on the edge of the inferior flap, and the flap was transposed superiorly to determine the excess skin to be excised at each interval. These points were marked with methylene blue. A line was then made to connect the points and thereby marking the amount of skin to be excised. The excess skin was excised from the superior aspect of the inferior flap. Attention was directed toward the axilla and flank. A contiguous parabola of skin was excised from the superior mid-axillary flank. Dissection in the axillary area was superficial to avoid disruption of the lymphatics. Hemostasis was obtained and the wound was irrigated with copious amount of fluid. Acosta's fascia was approximated using interrupted 3-0 vicryl. A 7 mm JOAN drain was placed deep to Acosta's fascia and secured to inferior flank closure with a 4-0 nylon. The skin was closed with a deep dermal running 4-0 Monocryl and a 4-0 Monocryl subcuticular suture. The skin was 1/2 inch SteriStrips cut into halves and placed crosswise across the incisions. The wounds were dressed with Kerlix rolls, ABD pads, and a 4\" ace wrap. The breast wounds were then steri-stripped, dressed with two Kerlix rolls, ABD pads, and a surgical bra  At the completion of the operation, needle and sponge counts were rendered correct. There was no active bleeding. All tissue appeared viable. Tressa Lay was extubated in the OR and transferred to PACU in stable condition. She was discharged to home in stable condition with written home care instructions. She was also given her prescriptions, home care instructions, and postoperative appointment logistics prior to surgery. Her instructions, postoperative care, drain care, and prescriptions were reviewed in the office prior to surgery. She has Dr. Shahida Edwards office and cell numbers to contact Dr. Rhiannon Tee with questions and concerns.     The surgical assistant (CSA) was present to help set up the operating room, position the patient, and was scrubbed through the entirety of the procedure. She helped to apply surgical dressings, transfer the patient to the gurney, and accompanied the transport of the patient to the PACU. The surgical assistant participated in all aspects of the procedure by critical retraction and exposure of the soft tissue for flap elevation. Her assistance was essential for the protection of vital anatomical structures and at all times medically necessary for the surgeon to successfully complete the operation. A credentialed and well-trained surgical assistant's ability exceeds the ability of a surgical technician (CST). Plastic surgical residents are not available at this institution, and the surgical assistant was required for the safety of patient care.

## (undated) DEVICE — 3M™ STERI-STRIP™ REINFORCED ADHESIVE SKIN CLOSURES, R1548, 1 IN X 5 IN (25 MM X 125 MM), 4 STRIPS/ENVELOPE: Brand: 3M™ STERI-STRIP™

## (undated) DEVICE — APPLICATOR COTTON TIP 6\" 2/PK

## (undated) DEVICE — SHEET,DRAPE,40X58,STERILE: Brand: MEDLINE

## (undated) DEVICE — 3M™ DURAPORE™ SURGICAL TAPE 2 INCHES X 10YARDS (5.0CM X 9.1M) 6ROLLS/CARTON 10CARTONS/CASE 1538-2: Brand: 3M™ DURAPORE™

## (undated) DEVICE — COVER,MAYO STAND,STERILE: Brand: MEDLINE

## (undated) DEVICE — SUT VICRYL 3-0 SH J416H

## (undated) DEVICE — SOL  .9 1000ML BTL

## (undated) DEVICE — PROXIMATE SKIN STAPLERS (35 WIDE) CONTAINS 35 STAINLESS STEEL STAPLES (FIXED HEAD): Brand: PROXIMATE

## (undated) DEVICE — CANISTER SHELL LIPOSUCTION

## (undated) DEVICE — #11 STERILE BLADE: Brand: POLYMER COATED BLADES

## (undated) DEVICE — Device

## (undated) DEVICE — GOWN,SIRUS,FABRIC-REINFORCED,LARGE: Brand: MEDLINE

## (undated) DEVICE — PAD,ABDOMINAL,8"X7.5",ST,LF,20/BX: Brand: MEDLINE INDUSTRIES, INC.

## (undated) DEVICE — DRAPE HALF 40X58 DYNJP2410

## (undated) DEVICE — STERILE HOOK LOCK LATEX FREE ELASTIC BANDAGE 4INX5YD: Brand: HOOK LOCK™

## (undated) DEVICE — EVACUATOR URO RELIVAC 100CC

## (undated) DEVICE — MARKER SKIN PREP RESIST STRL

## (undated) DEVICE — KENDALL SCD EXPRESS SLEEVES, KNEE LENGTH, MEDIUM: Brand: KENDALL SCD

## (undated) DEVICE — PSI-TEC TUBING: Brand: PSI-TEC TUBING

## (undated) DEVICE — BREAST-HERNIA-PORT CDS-LF: Brand: MEDLINE INDUSTRIES, INC.

## (undated) DEVICE — 3M™ IOBAN™ 2 ANTIMICROBIAL INCISE DRAPE 6648EZ: Brand: IOBAN™ 2

## (undated) DEVICE — PTFE COATED BLADE 2.75': Brand: MEDLINE

## (undated) DEVICE — BLADE ELECTRODE: Brand: EDGE

## (undated) DEVICE — SPONGE STICK WITH PVP-I: Brand: KENDALL

## (undated) DEVICE — STERILE POLYISOPRENE POWDER-FREE SURGICAL GLOVES: Brand: PROTEXIS

## (undated) DEVICE — 3M™ IOBAN™ 2 ANTIMICROBIAL INCISE DRAPE 6650EZ: Brand: IOBAN™ 2

## (undated) DEVICE — REM POLYHESIVE ADULT PATIENT RETURN ELECTRODE: Brand: VALLEYLAB

## (undated) DEVICE — 40580 - THE PINK PAD - ADVANCED TRENDELENBURG POSITIONING KIT: Brand: 40580 - THE PINK PAD - ADVANCED TRENDELENBURG POSITIONING KIT

## (undated) DEVICE — UNDYED BRAIDED (POLYGLACTIN 910), SYNTHETIC ABSORBABLE SUTURE: Brand: COATED VICRYL

## (undated) DEVICE — #15 STERILE STAINLESS BLADE: Brand: STERILE STAINLESS BLADES

## (undated) DEVICE — BANDAGE ROLL,100% COTTON, 6 PLY, LARGE: Brand: KERLIX

## (undated) DEVICE — APPLICATOR FBRTP 6IN STRL LF

## (undated) DEVICE — SUTURE VICRYL 3-0 SH

## (undated) DEVICE — DRAPE PACK CHEST & U BAR

## (undated) DEVICE — PROXIMATE RH ROTATING HEAD SKIN STAPLERS (35 WIDE) CONTAINS 35 STAINLESS STEEL STAPLES: Brand: PROXIMATE

## (undated) DEVICE — MARKER SKIN 2 TIP

## (undated) DEVICE — BASIC DOUBLE BASIN 1-LF: Brand: MEDLINE INDUSTRIES, INC.

## (undated) DEVICE — LAMINECTOMY ARM CRADLE FOAM POSITIONER: Brand: CARDINAL HEALTH

## (undated) DEVICE — LIGHT HANDLE

## (undated) DEVICE — 3M™ STERI-STRIP™ REINFORCED ADHESIVE SKIN CLOSURES, R1547, 1/2 IN X 4 IN (12 MM X 100 MM), 6 STRIPS/ENVELOPE: Brand: 3M™ STERI-STRIP™

## (undated) DEVICE — DRAIN SILICONE FLAT 7X20

## (undated) DEVICE — SUT MONOCRYL 4-0 PS-2 Y426H

## (undated) DEVICE — STOCKINETTE HYDROMED 3" 703033

## (undated) DEVICE — TOWEL: OR BLU 80/CS: Brand: MEDICAL ACTION INDUSTRIES

## (undated) DEVICE — CG INFILTRATION TUBING: Brand: CG INFILTRATION TUBING

## (undated) DEVICE — BANDAGE,GAUZE,BULKEE II,4.5"X4.1YD,STRL: Brand: MEDLINE

## (undated) DEVICE — ABDOMINAL PAD: Brand: DERMACEA

## (undated) DEVICE — SUT ETHILON 4-0 PS-2 1667H

## (undated) DEVICE — SKIN MARKER DUAL TIP WITH RULER CAP AND LABELS: Brand: DEVON

## (undated) DEVICE — SOLUTION  .9 1000ML BTL

## (undated) DEVICE — LAPAROTOMY SPONGE - RF AND X-RAY DETECTABLE PRE-WASHED: Brand: SITUATE

## (undated) DEVICE — APPLICATOR COTTON TIP 6IN

## (undated) DEVICE — PLASTIC BREAST CDS-LF: Brand: MEDLINE INDUSTRIES, INC.